# Patient Record
Sex: MALE | Race: WHITE | NOT HISPANIC OR LATINO
[De-identification: names, ages, dates, MRNs, and addresses within clinical notes are randomized per-mention and may not be internally consistent; named-entity substitution may affect disease eponyms.]

---

## 2018-08-13 PROBLEM — Z00.00 ENCOUNTER FOR PREVENTIVE HEALTH EXAMINATION: Status: ACTIVE | Noted: 2018-08-13

## 2018-09-04 ENCOUNTER — APPOINTMENT (OUTPATIENT)
Dept: PAIN MANAGEMENT | Facility: CLINIC | Age: 43
End: 2018-09-04

## 2021-07-02 ENCOUNTER — EMERGENCY (EMERGENCY)
Facility: HOSPITAL | Age: 46
LOS: 1 days | Discharge: HOME | End: 2021-07-02
Attending: EMERGENCY MEDICINE | Admitting: EMERGENCY MEDICINE
Payer: COMMERCIAL

## 2021-07-02 VITALS
TEMPERATURE: 99 F | RESPIRATION RATE: 17 BRPM | OXYGEN SATURATION: 100 % | DIASTOLIC BLOOD PRESSURE: 85 MMHG | HEART RATE: 99 BPM | HEIGHT: 69.02 IN | SYSTOLIC BLOOD PRESSURE: 160 MMHG

## 2021-07-02 VITALS
TEMPERATURE: 98 F | SYSTOLIC BLOOD PRESSURE: 143 MMHG | HEART RATE: 90 BPM | OXYGEN SATURATION: 100 % | DIASTOLIC BLOOD PRESSURE: 89 MMHG | RESPIRATION RATE: 16 BRPM

## 2021-07-02 LAB
ALBUMIN SERPL ELPH-MCNC: 4.8 G/DL — SIGNIFICANT CHANGE UP (ref 3.3–5)
ALP SERPL-CCNC: 99 U/L — SIGNIFICANT CHANGE UP (ref 40–120)
ALT FLD-CCNC: 58 U/L — HIGH (ref 4–41)
ANION GAP SERPL CALC-SCNC: 14 MMOL/L — SIGNIFICANT CHANGE UP (ref 7–14)
AST SERPL-CCNC: 32 U/L — SIGNIFICANT CHANGE UP (ref 4–40)
BASOPHILS # BLD AUTO: 0.03 K/UL — SIGNIFICANT CHANGE UP (ref 0–0.2)
BASOPHILS NFR BLD AUTO: 0.5 % — SIGNIFICANT CHANGE UP (ref 0–2)
BILIRUB SERPL-MCNC: 0.6 MG/DL — SIGNIFICANT CHANGE UP (ref 0.2–1.2)
BUN SERPL-MCNC: 10 MG/DL — SIGNIFICANT CHANGE UP (ref 7–23)
CALCIUM SERPL-MCNC: 9.4 MG/DL — SIGNIFICANT CHANGE UP (ref 8.4–10.5)
CHLORIDE SERPL-SCNC: 100 MMOL/L — SIGNIFICANT CHANGE UP (ref 98–107)
CO2 SERPL-SCNC: 25 MMOL/L — SIGNIFICANT CHANGE UP (ref 22–31)
CREAT SERPL-MCNC: 1.05 MG/DL — SIGNIFICANT CHANGE UP (ref 0.5–1.3)
EOSINOPHIL # BLD AUTO: 0.1 K/UL — SIGNIFICANT CHANGE UP (ref 0–0.5)
EOSINOPHIL NFR BLD AUTO: 1.7 % — SIGNIFICANT CHANGE UP (ref 0–6)
GLUCOSE SERPL-MCNC: 104 MG/DL — HIGH (ref 70–99)
HCT VFR BLD CALC: 49.4 % — SIGNIFICANT CHANGE UP (ref 39–50)
HGB BLD-MCNC: 16.4 G/DL — SIGNIFICANT CHANGE UP (ref 13–17)
IANC: 3.67 K/UL — SIGNIFICANT CHANGE UP (ref 1.5–8.5)
IMM GRANULOCYTES NFR BLD AUTO: 0.3 % — SIGNIFICANT CHANGE UP (ref 0–1.5)
LYMPHOCYTES # BLD AUTO: 1.74 K/UL — SIGNIFICANT CHANGE UP (ref 1–3.3)
LYMPHOCYTES # BLD AUTO: 28.9 % — SIGNIFICANT CHANGE UP (ref 13–44)
MCHC RBC-ENTMCNC: 28.7 PG — SIGNIFICANT CHANGE UP (ref 27–34)
MCHC RBC-ENTMCNC: 33.2 GM/DL — SIGNIFICANT CHANGE UP (ref 32–36)
MCV RBC AUTO: 86.5 FL — SIGNIFICANT CHANGE UP (ref 80–100)
MONOCYTES # BLD AUTO: 0.46 K/UL — SIGNIFICANT CHANGE UP (ref 0–0.9)
MONOCYTES NFR BLD AUTO: 7.6 % — SIGNIFICANT CHANGE UP (ref 2–14)
NEUTROPHILS # BLD AUTO: 3.67 K/UL — SIGNIFICANT CHANGE UP (ref 1.8–7.4)
NEUTROPHILS NFR BLD AUTO: 61 % — SIGNIFICANT CHANGE UP (ref 43–77)
NRBC # BLD: 0 /100 WBCS — SIGNIFICANT CHANGE UP
NRBC # FLD: 0 K/UL — SIGNIFICANT CHANGE UP
PLATELET # BLD AUTO: 291 K/UL — SIGNIFICANT CHANGE UP (ref 150–400)
POTASSIUM SERPL-MCNC: 3.7 MMOL/L — SIGNIFICANT CHANGE UP (ref 3.5–5.3)
POTASSIUM SERPL-SCNC: 3.7 MMOL/L — SIGNIFICANT CHANGE UP (ref 3.5–5.3)
PROT SERPL-MCNC: 7.5 G/DL — SIGNIFICANT CHANGE UP (ref 6–8.3)
RBC # BLD: 5.71 M/UL — SIGNIFICANT CHANGE UP (ref 4.2–5.8)
RBC # FLD: 12.6 % — SIGNIFICANT CHANGE UP (ref 10.3–14.5)
SARS-COV-2 RNA SPEC QL NAA+PROBE: SIGNIFICANT CHANGE UP
SODIUM SERPL-SCNC: 139 MMOL/L — SIGNIFICANT CHANGE UP (ref 135–145)
WBC # BLD: 6.02 K/UL — SIGNIFICANT CHANGE UP (ref 3.8–10.5)
WBC # FLD AUTO: 6.02 K/UL — SIGNIFICANT CHANGE UP (ref 3.8–10.5)

## 2021-07-02 PROCEDURE — 73090 X-RAY EXAM OF FOREARM: CPT | Mod: 26,LT

## 2021-07-02 PROCEDURE — 99284 EMERGENCY DEPT VISIT MOD MDM: CPT

## 2021-07-02 PROCEDURE — 73070 X-RAY EXAM OF ELBOW: CPT | Mod: 26,LT

## 2021-07-02 RX ADMIN — Medication 450 MILLIGRAM(S): at 15:40

## 2021-07-02 NOTE — ED PROVIDER NOTE - NSFOLLOWUPINSTRUCTIONS_ED_ALL_ED_FT
1. STOP TAKING THE BACTRIM THAT YOU WERE PRESCRIBED.  2. BEGIN TAKING THE CLINDAMYCIN THAT YOU WERE PRESCRIBED IN THE ED TODAY.  3. RETURN TO THE ED IMMEDIATELY IF YOUR ARM BEGINS TO GET MORE RED, OR IF YOU DEVELOP WORSENING SWELLING, PAIN, FEVER OR DIFFICULTY MOVING ARM.  4. FOLLOW UP WITH YOUR DOCTOR IN 1 WEEK TO CHECK FOR IMPROVEMENT OF YOUR ARM.  5. FOR PAIN OR FEVER YOU CAN TAKE OVER THE COUNTER IBUPROFEN OR TYLENOL AS DIRECTED ON PACKAGING.

## 2021-07-02 NOTE — ED ADULT NURSE NOTE - OBJECTIVE STATEMENT
A&Ox4. Pt. states that he was bit on his left elbow yesterday, pt. believes it was from a horsefly. He states that he was prescribed ABX but since then his left elbow and posterior forearm have become red and swollen. Pinpoint red [place] noted to left elbow with surrounding area being swollen.

## 2021-07-02 NOTE — ED PROVIDER NOTE - CLINICAL SUMMARY MEDICAL DECISION MAKING FREE TEXT BOX
47 yo male with swelling to left elbow/forearm after insect bite and on day 3 of TMP-SMX; does not look overtly like cellulitis (and significantly improved compared to photo pt showed me from day after bite); pt very well appearing and in NAD; will check labs and XR and then re-eval need to give IV abx (i.e. CDU); pt declines any pain medication in ED at this time 45 yo male with swelling to left elbow/forearm after insect bite and on day 3 of TMP-SMX; does not look overtly like cellulitis (and significantly improved compared to photo pt showed me from day after bite); pt very well appearing and in NAD; not concerning for compartment syndrome as pt has soft compartments, no pain, warm and well perfused arm and strong radial pulses; will check labs and XR and then re-eval need to give IV abx (i.e. CDU); pt declines any pain medication in ED at this time 45 yo male with swelling to left elbow/forearm after insect bite and on day 3 of TMP-SMX; does not look overtly like cellulitis (and significantly improved compared to photo pt showed me from day after bite) but may be early; pt very well appearing and in NAD; not concerning for compartment syndrome as pt has soft compartments, no pain, warm and well perfused arm and strong radial pulses; will check labs and XR and then re-eval need to give IV abx (i.e. CDU); pt declines any pain medication in ED at this time

## 2021-07-02 NOTE — ED PROVIDER NOTE - PATIENT PORTAL LINK FT
You can access the FollowMyHealth Patient Portal offered by [facility] by registering at the following website: [email or web address]. By joining [employer]’s FollowMyHealth portal, you will also be able to view your health information using other applications (apps) compatible with our system.

## 2021-07-02 NOTE — ED PROVIDER NOTE - OBJECTIVE STATEMENT
Dr. Santiago: 47 yo right hand dominant male with HTN in ED sent from urgent care for wound check and possible IV abx.  Pt was bit on left elbow by unknown insect 3 days ago while outside.  He went to urgent care 2 days ago for this due to redness to the left elbow/forearm and was prescribed TMP-SMX (today is day 3 of abx).  Redness has improved, as have pain and itching, but swelling has increased.  Pt went to urgent care today for follow up due to worsening swelling and was referred to ED.  He denies fever, CP/SOB, N/V/D, abdominal pain or other complaints.  Left elbow and forearm feel "tight" when he ranges elbow, but no pain and pt still able to use elbow normally.

## 2021-07-02 NOTE — ED PROVIDER NOTE - PROGRESS NOTE DETAILS
Dr. Santiago: imaging discussed with radiology--no tracking gas seen, only edema; discussed with pt that labs are reassuring, no fractures on xray, no tracking gas; discussed with pt options to place in CDU for IV abx vs. discharge home with trial of new PO abx and strict return precautions; I believe that with labs reassuring, pt very well appearing and pt appearing very reliable, trial of outpatient PO abx is reasonable; pt agrees with plan, states he will watch arm closely and return to ED immediately if he develops any worsening of symptoms; advised pt to stop taking TMP-SMX that he was taking; gave first dose of abx in ED Dr. Santiago: imaging discussed with radiology--no tracking gas seen, only edema; discussed with pt that labs are reassuring, no fractures on xray, no tracking gas; consistent with possible cellulitis, but no sepsis and pt very well appearing; discussed with pt options to place in CDU for IV abx vs. discharge home with trial of new PO abx and strict return precautions; I believe that with labs reassuring, pt very well appearing and pt appearing very reliable, trial of outpatient PO abx is reasonable; pt agrees with plan, states he will watch arm closely and return to ED immediately if he develops any worsening of symptoms; advised pt to stop taking TMP-SMX that he was taking; gave first dose of abx in ED

## 2021-07-02 NOTE — ED ADULT TRIAGE NOTE - CHIEF COMPLAINT QUOTE
pt had bug bite to L elbow, was placed on PO abx by PMD for inflammation. sent in for possible IV abx.

## 2022-07-12 NOTE — ED PROVIDER NOTE - PHYSICAL EXAMINATION
left UE: moderate circumferential swelling from mid UE extending to mid forearm, no erythema but +warmth, no TTP; full ROM to elbow with "pulling" sensation on ranging; warm and well perfused left hand with normal radial pulse left UE: moderate circumferential swelling from mid UE extending to mid forearm, small discreet area of erythema to left medial forearm but has not worsened over the last few days as per pt; +warmth to forearm and elbow but no TTP; full ROM to elbow with "pulling" sensation on ranging; warm and well perfused left hand with normal radial pulse No COVID test required